# Patient Record
Sex: MALE | Race: WHITE | NOT HISPANIC OR LATINO | URBAN - METROPOLITAN AREA
[De-identification: names, ages, dates, MRNs, and addresses within clinical notes are randomized per-mention and may not be internally consistent; named-entity substitution may affect disease eponyms.]

---

## 2019-03-12 ENCOUNTER — EMERGENCY (EMERGENCY)
Facility: HOSPITAL | Age: 69
LOS: 0 days | Discharge: HOME | End: 2019-03-13
Attending: EMERGENCY MEDICINE
Payer: COMMERCIAL

## 2019-03-12 VITALS
TEMPERATURE: 98 F | OXYGEN SATURATION: 99 % | WEIGHT: 177.91 LBS | RESPIRATION RATE: 18 BRPM | SYSTOLIC BLOOD PRESSURE: 133 MMHG | HEART RATE: 78 BPM | HEIGHT: 75 IN | DIASTOLIC BLOOD PRESSURE: 97 MMHG

## 2019-03-12 DIAGNOSIS — R07.89 OTHER CHEST PAIN: ICD-10-CM

## 2019-03-12 DIAGNOSIS — Z04.1 ENCOUNTER FOR EXAMINATION AND OBSERVATION FOLLOWING TRANSPORT ACCIDENT: ICD-10-CM
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
## 2019-03-12 DIAGNOSIS — Z98.89 OTHER SPECIFIED POSTPROCEDURAL STATES: Chronic | ICD-10-CM
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
## 2019-03-12 DIAGNOSIS — I10 ESSENTIAL (PRIMARY) HYPERTENSION: ICD-10-CM

## 2019-03-12 PROCEDURE — 99053 MED SERV 10PM-8AM 24 HR FAC: CPT

## 2019-03-12 PROCEDURE — 99284 EMERGENCY DEPT VISIT MOD MDM: CPT | Mod: 25
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
## 2019-03-13 VITALS
TEMPERATURE: 98 F | DIASTOLIC BLOOD PRESSURE: 67 MMHG | SYSTOLIC BLOOD PRESSURE: 121 MMHG | RESPIRATION RATE: 15 BRPM | OXYGEN SATURATION: 98 % | HEART RATE: 63 BPM

## 2019-03-13 LAB
ALBUMIN SERPL ELPH-MCNC: 3.6 G/DL — SIGNIFICANT CHANGE UP (ref 3.3–5)
ALP SERPL-CCNC: 73 U/L — SIGNIFICANT CHANGE UP (ref 40–120)
ALT FLD-CCNC: 34 U/L — SIGNIFICANT CHANGE UP (ref 12–78)
ANION GAP SERPL CALC-SCNC: 6 MMOL/L — SIGNIFICANT CHANGE UP (ref 5–17)
APTT BLD: 28.7 SEC — SIGNIFICANT CHANGE UP (ref 28.5–37)
AST SERPL-CCNC: 21 U/L — SIGNIFICANT CHANGE UP (ref 15–37)
BASOPHILS # BLD AUTO: 0.04 K/UL — SIGNIFICANT CHANGE UP (ref 0–0.2)
BASOPHILS NFR BLD AUTO: 0.3 % — SIGNIFICANT CHANGE UP (ref 0–2)
BILIRUB SERPL-MCNC: 0.3 MG/DL — SIGNIFICANT CHANGE UP (ref 0.2–1.2)
BUN SERPL-MCNC: 26 MG/DL — HIGH (ref 7–23)
CALCIUM SERPL-MCNC: 8.5 MG/DL — SIGNIFICANT CHANGE UP (ref 8.5–10.1)
CHLORIDE SERPL-SCNC: 108 MMOL/L — SIGNIFICANT CHANGE UP (ref 96–108)
CK SERPL-CCNC: 174 U/L — SIGNIFICANT CHANGE UP (ref 26–308)
CO2 SERPL-SCNC: 27 MMOL/L — SIGNIFICANT CHANGE UP (ref 22–31)
CREAT SERPL-MCNC: 0.99 MG/DL — SIGNIFICANT CHANGE UP (ref 0.5–1.3)
EOSINOPHIL # BLD AUTO: 0.12 K/UL — SIGNIFICANT CHANGE UP (ref 0–0.5)
EOSINOPHIL NFR BLD AUTO: 0.9 % — SIGNIFICANT CHANGE UP (ref 0–6)
GLUCOSE SERPL-MCNC: 107 MG/DL — HIGH (ref 70–99)
HCT VFR BLD CALC: 44 % — SIGNIFICANT CHANGE UP (ref 39–50)
HGB BLD-MCNC: 14.2 G/DL — SIGNIFICANT CHANGE UP (ref 13–17)
IMM GRANULOCYTES NFR BLD AUTO: 0.3 % — SIGNIFICANT CHANGE UP (ref 0–1.5)
INR BLD: 1.07 RATIO — SIGNIFICANT CHANGE UP (ref 0.88–1.16)
LYMPHOCYTES # BLD AUTO: 1.17 K/UL — SIGNIFICANT CHANGE UP (ref 1–3.3)
LYMPHOCYTES # BLD AUTO: 8.9 % — LOW (ref 13–44)
MCHC RBC-ENTMCNC: 28.9 PG — SIGNIFICANT CHANGE UP (ref 27–34)
MCHC RBC-ENTMCNC: 32.3 GM/DL — SIGNIFICANT CHANGE UP (ref 32–36)
MCV RBC AUTO: 89.6 FL — SIGNIFICANT CHANGE UP (ref 80–100)
MONOCYTES # BLD AUTO: 0.67 K/UL — SIGNIFICANT CHANGE UP (ref 0–0.9)
MONOCYTES NFR BLD AUTO: 5.1 % — SIGNIFICANT CHANGE UP (ref 2–14)
NEUTROPHILS # BLD AUTO: 11.09 K/UL — HIGH (ref 1.8–7.4)
NEUTROPHILS NFR BLD AUTO: 84.5 % — HIGH (ref 43–77)
NRBC # BLD: 0 /100 WBCS — SIGNIFICANT CHANGE UP (ref 0–0)
PLATELET # BLD AUTO: 193 K/UL — SIGNIFICANT CHANGE UP (ref 150–400)
POTASSIUM SERPL-MCNC: 3.8 MMOL/L — SIGNIFICANT CHANGE UP (ref 3.5–5.3)
POTASSIUM SERPL-SCNC: 3.8 MMOL/L — SIGNIFICANT CHANGE UP (ref 3.5–5.3)
PROT SERPL-MCNC: 7.1 GM/DL — SIGNIFICANT CHANGE UP (ref 6–8.3)
PROTHROM AB SERPL-ACNC: 12 SEC — SIGNIFICANT CHANGE UP (ref 10–12.9)
RBC # BLD: 4.91 M/UL — SIGNIFICANT CHANGE UP (ref 4.2–5.8)
RBC # FLD: 12.8 % — SIGNIFICANT CHANGE UP (ref 10.3–14.5)
SODIUM SERPL-SCNC: 141 MMOL/L — SIGNIFICANT CHANGE UP (ref 135–145)
TROPONIN I SERPL-MCNC: <.015 NG/ML — SIGNIFICANT CHANGE UP (ref 0.01–0.04)
TROPONIN I SERPL-MCNC: <.015 NG/ML — SIGNIFICANT CHANGE UP (ref 0.01–0.04)
WBC # BLD: 13.13 K/UL — HIGH (ref 3.8–10.5)
WBC # FLD AUTO: 13.13 K/UL — HIGH (ref 3.8–10.5)

## 2019-03-13 PROCEDURE — 71260 CT THORAX DX C+: CPT | Mod: 26

## 2019-03-13 RX ORDER — SODIUM CHLORIDE 9 MG/ML
1000 INJECTION INTRAMUSCULAR; INTRAVENOUS; SUBCUTANEOUS ONCE
Qty: 0 | Refills: 0 | Status: COMPLETED | OUTPATIENT
Start: 2019-03-13 | End: 2019-03-13

## 2019-03-13 RX ORDER — IBUPROFEN 200 MG
1 TABLET ORAL
Qty: 20 | Refills: 0 | OUTPATIENT
Start: 2019-03-13 | End: 2019-03-17

## 2019-03-13 RX ORDER — IBUPROFEN 200 MG
600 TABLET ORAL ONCE
Qty: 0 | Refills: 0 | Status: COMPLETED | OUTPATIENT
Start: 2019-03-13 | End: 2019-03-13

## 2019-03-13 RX ADMIN — SODIUM CHLORIDE 1000 MILLILITER(S): 9 INJECTION INTRAMUSCULAR; INTRAVENOUS; SUBCUTANEOUS at 02:09

## 2019-03-13 RX ADMIN — Medication 600 MILLIGRAM(S): at 02:09

## 2019-03-13 NOTE — ED ADULT NURSE NOTE - PSH
S/P foot surgery  RiGHT and Left foot hammertoes correction  S/P hernia surgery  Right and Left inguinal hernia repair

## 2019-03-13 NOTE — ED PROVIDER NOTE - PHYSICAL EXAMINATION
Gen: Alert, mild distress, well appearing  Head: NC, AT, PERRL, EOMI, normal lids/conjunctiva  ENT: normal hearing, patent oropharynx without erythema/exudate, uvula midline  Neck: +supple, no tenderness/meningismus/JVD, +Trachea midline  Pulm: Bilateral BS, normal resp effort, no wheeze/stridor/retractions  CV: RRR, no M/R/G, +dist pulses  Abd: soft, NT/ND, +BS, no palpable masses  Mskel: no edema/erythema/cyanosis, +sternal chest wall tenderness to palpitation  Skin: no rash, warm/dry  Neuro: AAOx3, no apparent sensory/motor deficits, coordination intact

## 2019-03-13 NOTE — ED ADULT NURSE NOTE - ED STAT RN HANDOFF DETAILS 2
PT general condition remains the same saturation 98 % on 2L, wheezing preseent endorsed to GAVIN Potter for continued care. general condition remains stable awaiting discharged.

## 2019-03-13 NOTE — ED PROVIDER NOTE - EKG ADDITIONAL INFORMATION FREE TEXT
NSR rate 70, no st e/d, no twi, qtc 429  no prior NSR rate 70, no st e/d, no twi, qtc 429  no prior  Repeat EKG: rate 60, no st e/d, no twi, qtc 428

## 2019-03-13 NOTE — ED ADULT NURSE NOTE - NSIMPLEMENTINTERV_GEN_ALL_ED
Implemented All Universal Safety Interventions:  [place] to call system. Call bell, personal items and telephone within reach. Instruct patient to call for assistance. Room bathroom lighting operational. Non-slip footwear when patient is off stretcher. Physically safe environment: no spills, clutter or unnecessary equipment. Stretcher in lowest position, wheels locked, appropriate side rails in place.
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
## 2022-06-30 ENCOUNTER — APPOINTMENT (OUTPATIENT)
Dept: ORTHOPEDIC SURGERY | Facility: CLINIC | Age: 72
End: 2022-06-30

## 2022-06-30 VITALS — BODY MASS INDEX: 22.13 KG/M2 | HEIGHT: 75 IN | WEIGHT: 178 LBS

## 2022-06-30 DIAGNOSIS — I10 ESSENTIAL (PRIMARY) HYPERTENSION: ICD-10-CM

## 2022-06-30 PROCEDURE — 99214 OFFICE O/P EST MOD 30 MIN: CPT | Mod: 25

## 2022-06-30 PROCEDURE — 20610 DRAIN/INJ JOINT/BURSA W/O US: CPT | Mod: NC

## 2022-06-30 NOTE — PROCEDURE
[de-identified] : Procedure Name: Orthovisc (Large Joint)\par \par Viscosupplementation Injection: X-ray evidence of Osteoarthritis on this or prior visit, Patient has tried OTC's including aspirin, Ibuprofen, Aleve etc or prescription NSAIDS, and/or exercises at home and/ or physical therapy without satisfactory response and Repeat series performed because patient had significant improvement in their pain and functional capacity from prior series which was given more than six months ago. \par \par An injection of Orthovisc 2ml #1 was injected into the bilateral knee(s). The risks, benefits, and alternatives to Viscosupplementation injection were explained in full to the patient. Risks outlined include but are not limited to infection, sepsis, bleeding, scarring, skin discoloration, temporary increase in pain, syncopal episode, failure to resolve symptoms, allergic reaction, and symptom recurrence. Signs and symptoms of infection reviewed and patient advised to call immediately for redness, fevers, and/or chills. Patient understood the risks. All questions were answered. After discussion of options, patient requested Viscosupplementation. Oral informed consent was obtained and sterile prep was done of the injection site. The patient tolerated the procedure well. Ice tonight to the injection site. \par

## 2022-06-30 NOTE — IMAGING
[de-identified] : Left Hip/Thigh: Inspection of the hip/thigh is as follows: Inspection shows no swelling. Palp\par follows: no groin tenderness. Range of motion of the hip is as follows: Patient displays good e\par rotation at 15 degrees Strength testing of the hip/thigh is as follows: Hip flexion strength is \par hip/thigh is as follows: no pain at hip flexors with hip extention and knee flexion. \par Right Knee: Inspection of the knee is as follows: valgus deformity. no ecchymosis and n\par the knee is as follows: medial joint line tenderness and crepitus. Knee Range of Motio\par follows: Extension: 0 degrees. Flexion: 125 degrees. Strength examination of the knee is \par strength is 5/5 Hamstring strength is 5/5 Ligament Stability and Special Test ligamentously st\par examination of the knee is as follows: light touch is intact throughout. Gait and function is a\par Left Knee: Inspection of the knee is as follows: no effusion, erythema, ecchymosis, scars o\par the knee is as follows: medial joint line tenderness, medial femoral condyle tendernes\par Range of Motion is as follows: Flexion: 125 degrees. Anterior pain with flexion. Range o\par extension. Strength examination of the knee is as follows: Quadriceps strength is 5/5 Hamstr\par 5/5 Ligament Stability and Special Test ligamentously stable. Neurological examination of the \par touch is intact throughout. Gait and function is as follows: patient ambulates without as
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
## 2022-07-07 ENCOUNTER — APPOINTMENT (OUTPATIENT)
Dept: ORTHOPEDIC SURGERY | Facility: CLINIC | Age: 72
End: 2022-07-07

## 2022-07-07 VITALS — HEIGHT: 75 IN | BODY MASS INDEX: 22.13 KG/M2 | WEIGHT: 178 LBS

## 2022-07-07 PROCEDURE — 20610 DRAIN/INJ JOINT/BURSA W/O US: CPT | Mod: 50

## 2022-07-07 NOTE — IMAGING
[de-identified] : Left Hip/Thigh: Inspection of the hip/thigh is as follows: Inspection shows no swelling. Palp\par follows: no groin tenderness. Range of motion of the hip is as follows: Patient displays good e\par rotation at 15 degrees Strength testing of the hip/thigh is as follows: Hip flexion strength is \par hip/thigh is as follows: no pain at hip flexors with hip extention and knee flexion. \par Right Knee: Inspection of the knee is as follows: valgus deformity. no ecchymosis and n\par the knee is as follows: medial joint line tenderness and crepitus. Knee Range of Motio\par follows: Extension: 0 degrees. Flexion: 125 degrees. Strength examination of the knee is \par strength is 5/5 Hamstring strength is 5/5 Ligament Stability and Special Test ligamentously st\par examination of the knee is as follows: light touch is intact throughout. Gait and function is a\par Left Knee: Inspection of the knee is as follows: no effusion, erythema, ecchymosis, scars o\par the knee is as follows: medial joint line tenderness, medial femoral condyle tendernes\par Range of Motion is as follows: Flexion: 125 degrees. Anterior pain with flexion. Range o\par extension. Strength examination of the knee is as follows: Quadriceps strength is 5/5 Hamstr\par 5/5 Ligament Stability and Special Test ligamentously stable. Neurological examination of the \par touch is intact throughout. Gait and function is as follows: patient ambulates without as
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
## 2022-07-07 NOTE — PROCEDURE
[de-identified] : Procedure Name: Orthovisc (Large Joint)\par \par Viscosupplementation Injection: X-ray evidence of Osteoarthritis on this or prior visit, Patient has tried OTC's including aspirin, Ibuprofen, Aleve etc or prescription NSAIDS, and/or exercises at home and/ or physical therapy without satisfactory response and Repeat series performed because patient had significant improvement in their pain and functional capacity from prior series which was given more than six months ago. \par \par An injection of Orthovisc 2ml #2 was injected into the bilateral knee(s). The risks, benefits, and alternatives to Viscosupplementation injection were explained in full to the patient. Risks outlined include but are not limited to infection, sepsis, bleeding, scarring, skin discoloration, temporary increase in pain, syncopal episode, failure to resolve symptoms, allergic reaction, and symptom recurrence. Signs and symptoms of infection reviewed and patient advised to call immediately for redness, fevers, and/or chills. Patient understood the risks. All questions were answered. After discussion of options, patient requested Viscosupplementation. Oral informed consent was obtained and sterile prep was done of the injection site. The patient tolerated the procedure well. Ice tonight to the injection site. \par

## 2022-07-14 ENCOUNTER — APPOINTMENT (OUTPATIENT)
Dept: ORTHOPEDIC SURGERY | Facility: CLINIC | Age: 72
End: 2022-07-14

## 2022-07-14 PROCEDURE — 99214 OFFICE O/P EST MOD 30 MIN: CPT | Mod: 25

## 2022-07-14 PROCEDURE — 20610 DRAIN/INJ JOINT/BURSA W/O US: CPT | Mod: 50

## 2022-07-14 NOTE — PROCEDURE
[de-identified] : Procedure Name: Orthovisc (Large Joint)\par \par Viscosupplementation Injection: X-ray evidence of Osteoarthritis on this or prior visit, Patient has tried OTC's including aspirin, Ibuprofen, Aleve etc or prescription NSAIDS, and/or exercises at home and/ or physical therapy without satisfactory response and Repeat series performed because patient had significant improvement in their pain and functional capacity from prior series which was given more than six months ago. \par \par An injection of Orthovisc 2ml #3 was injected into the bilateral knee(s). The risks, benefits, and alternatives to Viscosupplementation injection were explained in full to the patient. Risks outlined include but are not limited to infection, sepsis, bleeding, scarring, skin discoloration, temporary increase in pain, syncopal episode, failure to resolve symptoms, allergic reaction, and symptom recurrence. Signs and symptoms of infection reviewed and patient advised to call immediately for redness, fevers, and/or chills. Patient understood the risks. All questions were answered. After discussion of options, patient requested Viscosupplementation. Oral informed consent was obtained and sterile prep was done of the injection site. The patient tolerated the procedure well. Ice tonight to the injection site. \par

## 2022-07-14 NOTE — IMAGING
[de-identified] : Left Hip/Thigh: Inspection of the hip/thigh is as follows: Inspection shows no swelling. Palp\par follows: no groin tenderness. Range of motion of the hip is as follows: Patient displays good e\par rotation at 15 degrees Strength testing of the hip/thigh is as follows: Hip flexion strength is \par hip/thigh is as follows: no pain at hip flexors with hip extention and knee flexion. \par Right Knee: Inspection of the knee is as follows: valgus deformity. no ecchymosis and n\par the knee is as follows: medial joint line tenderness and crepitus. Knee Range of Motio\par follows: Extension: 0 degrees. Flexion: 125 degrees. Strength examination of the knee is \par strength is 5/5 Hamstring strength is 5/5 Ligament Stability and Special Test ligamentously st\par examination of the knee is as follows: light touch is intact throughout. Gait and function is a\par Left Knee: Inspection of the knee is as follows: no effusion, erythema, ecchymosis, scars o\par the knee is as follows: medial joint line tenderness, medial femoral condyle tendernes\par Range of Motion is as follows: Flexion: 125 degrees. Anterior pain with flexion. Range o\par extension. Strength examination of the knee is as follows: Quadriceps strength is 5/5 Hamstr\par 5/5 Ligament Stability and Special Test ligamentously stable. Neurological examination of the \par touch is intact throughout. Gait and function is as follows: patient ambulates without as
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
## 2022-07-28 ENCOUNTER — APPOINTMENT (OUTPATIENT)
Dept: ORTHOPEDIC SURGERY | Facility: CLINIC | Age: 72
End: 2022-07-28

## 2022-07-28 PROCEDURE — 20610 DRAIN/INJ JOINT/BURSA W/O US: CPT | Mod: 50
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
## 2022-07-28 NOTE — IMAGING
[de-identified] : Left Hip/Thigh: Inspection of the hip/thigh is as follows: Inspection shows no swelling. Palp\par follows: no groin tenderness. Range of motion of the hip is as follows: Patient displays good e\par rotation at 15 degrees Strength testing of the hip/thigh is as follows: Hip flexion strength is \par hip/thigh is as follows: no pain at hip flexors with hip extention and knee flexion. \par Right Knee: Inspection of the knee is as follows: valgus deformity. no ecchymosis and n\par the knee is as follows: medial joint line tenderness and crepitus. Knee Range of Motio\par follows: Extension: 0 degrees. Flexion: 125 degrees. Strength examination of the knee is \par strength is 5/5 Hamstring strength is 5/5 Ligament Stability and Special Test ligamentously st\par examination of the knee is as follows: light touch is intact throughout. Gait and function is a\par Left Knee: Inspection of the knee is as follows: no effusion, erythema, ecchymosis, scars o\par the knee is as follows: medial joint line tenderness, medial femoral condyle tendernes\par Range of Motion is as follows: Flexion: 125 degrees. Anterior pain with flexion. Range o\par extension. Strength examination of the knee is as follows: Quadriceps strength is 5/5 Hamstr\par 5/5 Ligament Stability and Special Test ligamentously stable. Neurological examination of the \par touch is intact throughout. Gait and function is as follows: patient ambulates without as

## 2022-07-28 NOTE — PROCEDURE
[de-identified] : Procedure Name: Orthovisc (Large Joint)\par \par Viscosupplementation Injection: X-ray evidence of Osteoarthritis on this or prior visit, Patient has tried OTC's including aspirin, Ibuprofen, Aleve etc or prescription NSAIDS, and/or exercises at home and/ or physical therapy without satisfactory response and Repeat series performed because patient had significant improvement in their pain and functional capacity from prior series which was given more than six months ago. \par \par An injection of Orthovisc 2ml #4 was injected into the bilateral knee(s). The risks, benefits, and alternatives to Viscosupplementation injection were explained in full to the patient. Risks outlined include but are not limited to infection, sepsis, bleeding, scarring, skin discoloration, temporary increase in pain, syncopal episode, failure to resolve symptoms, allergic reaction, and symptom recurrence. Signs and symptoms of infection reviewed and patient advised to call immediately for redness, fevers, and/or chills. Patient understood the risks. All questions were answered. After discussion of options, patient requested Viscosupplementation. Oral informed consent was obtained and sterile prep was done of the injection site. The patient tolerated the procedure well. Ice tonight to the injection site. \par

## 2022-11-14 NOTE — REASON FOR VISIT
[FreeTextEntry2] : orthovisc bilateral knees number 4
PAST MEDICAL HISTORY:  BPH (benign prostatic hypertrophy)     CKD (chronic kidney disease)     DM (diabetes mellitus)     HLD (hyperlipidemia)     HTN (hypertension)     PAD (peripheral artery disease) s/p stent placement

## 2023-02-02 ENCOUNTER — APPOINTMENT (OUTPATIENT)
Dept: ORTHOPEDIC SURGERY | Facility: CLINIC | Age: 73
End: 2023-02-02
Payer: MEDICARE

## 2023-02-02 VITALS — BODY MASS INDEX: 22.13 KG/M2 | HEIGHT: 75 IN | WEIGHT: 178 LBS

## 2023-02-02 PROCEDURE — 20610 DRAIN/INJ JOINT/BURSA W/O US: CPT | Mod: 50

## 2023-02-02 PROCEDURE — 99214 OFFICE O/P EST MOD 30 MIN: CPT | Mod: 25

## 2023-02-02 PROCEDURE — 73564 X-RAY EXAM KNEE 4 OR MORE: CPT | Mod: 50
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
## 2023-02-02 NOTE — ASSESSMENT
[FreeTextEntry1] : 72 year M WITH MODERATE B/L KNEE OA. HAD GOOD RELIEF FROM ORTHOVISC SERIES 7/28/22. PAIN WORSENS WITH STAIRS AND WALKING PROLONGED DISTANCES. PAIN IS AFFECTING ADL AND FUNCTIONAL ACTIVITIES. TREATMENT OPTIONS REVIEWED. \par \par B/L KNEE ORTHOVISC #1 TODAY. PATIENT TOLERATED INJECTION WELL.

## 2023-02-02 NOTE — PROCEDURE
[de-identified] : Procedure Name: Orthovisc (Large Joint)\par \par Viscosupplementation Injection: X-ray evidence of Osteoarthritis on this or prior visit, Patient has tried OTC's including aspirin, Ibuprofen, Aleve etc or prescription NSAIDS, and/or exercises at home and/ or physical therapy without satisfactory response and Repeat series performed because patient had significant improvement in their pain and functional capacity from prior series which was given more than six months ago. \par \par An injection of Orthovisc 2ml #1 was injected into the bilateral knee(s). The risks, benefits, and alternatives to Viscosupplementation injection were explained in full to the patient. Risks outlined include but are not limited to infection, sepsis, bleeding, scarring, skin discoloration, temporary increase in pain, syncopal episode, failure to resolve symptoms, allergic reaction, and symptom recurrence. Signs and symptoms of infection reviewed and patient advised to call immediately for redness, fevers, and/or chills. Patient understood the risks. All questions were answered. After discussion of options, patient requested Viscosupplementation. Oral informed consent was obtained and sterile prep was done of the injection site. The patient tolerated the procedure well. Ice tonight to the injection site. \par

## 2023-02-02 NOTE — IMAGING
[de-identified] : Left Hip/Thigh: Inspection of the hip/thigh is as follows: Inspection shows no swelling. Palp\par follows: no groin tenderness. Range of motion of the hip is as follows: Patient displays good e\par rotation at 15 degrees Strength testing of the hip/thigh is as follows: Hip flexion strength is \par hip/thigh is as follows: no pain at hip flexors with hip extention and knee flexion. \par Right Knee: Inspection of the knee is as follows: valgus deformity. no ecchymosis and n\par the knee is as follows: medial joint line tenderness and crepitus. Knee Range of Motio\par follows: Extension: 0 degrees. Flexion: 125 degrees. Strength examination of the knee is \par strength is 5/5 Hamstring strength is 5/5 Ligament Stability and Special Test ligamentously st\par examination of the knee is as follows: light touch is intact throughout. Gait and function is a\par Left Knee: Inspection of the knee is as follows: no effusion, erythema, ecchymosis, scars o\par the knee is as follows: medial joint line tenderness, medial femoral condyle tendernes\par Range of Motion is as follows: Flexion: 125 degrees. Anterior pain with flexion. Range o\par extension. Strength examination of the knee is as follows: Quadriceps strength is 5/5 Hamstr\par 5/5 Ligament Stability and Special Test ligamentously stable. Neurological examination of the \par touch is intact throughout. Gait and function is as follows: patient ambulates without as

## 2023-02-09 ENCOUNTER — APPOINTMENT (OUTPATIENT)
Dept: ORTHOPEDIC SURGERY | Facility: CLINIC | Age: 73
End: 2023-02-09
Payer: MEDICARE

## 2023-02-09 PROCEDURE — 20610 DRAIN/INJ JOINT/BURSA W/O US: CPT | Mod: 50

## 2023-02-09 NOTE — PROCEDURE
[de-identified] : Procedure Name: Orthovisc (Large Joint)\par \par Viscosupplementation Injection: X-ray evidence of Osteoarthritis on this or prior visit, Patient has tried OTC's including aspirin, Ibuprofen, Aleve etc or prescription NSAIDS, and/or exercises at home and/ or physical therapy without satisfactory response and Repeat series performed because patient had significant improvement in their pain and functional capacity from prior series which was given more than six months ago. \par \par An injection of Orthovisc 2ml #2 was injected into the bilateral knee(s). The risks, benefits, and alternatives to Viscosupplementation injection were explained in full to the patient. Risks outlined include but are not limited to infection, sepsis, bleeding, scarring, skin discoloration, temporary increase in pain, syncopal episode, failure to resolve symptoms, allergic reaction, and symptom recurrence. Signs and symptoms of infection reviewed and patient advised to call immediately for redness, fevers, and/or chills. Patient understood the risks. All questions were answered. After discussion of options, patient requested Viscosupplementation. Oral informed consent was obtained and sterile prep was done of the injection site. The patient tolerated the procedure well. Ice tonight to the injection site. \par

## 2023-02-09 NOTE — ASSESSMENT
[FreeTextEntry1] : 72 year M WITH MODERATE B/L KNEE OA. HAD GOOD RELIEF FROM ORTHOVISC SERIES 7/28/22. PAIN WORSENS WITH STAIRS AND WALKING PROLONGED DISTANCES. PAIN IS AFFECTING ADL AND FUNCTIONAL ACTIVITIES. TREATMENT OPTIONS REVIEWED. \par \par B/L KNEE ORTHOVISC #2 TODAY. PATIENT TOLERATED INJECTION WELL.
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
## 2023-02-09 NOTE — IMAGING
[de-identified] : Left Hip/Thigh: Inspection of the hip/thigh is as follows: Inspection shows no swelling. Palp\par follows: no groin tenderness. Range of motion of the hip is as follows: Patient displays good e\par rotation at 15 degrees Strength testing of the hip/thigh is as follows: Hip flexion strength is \par hip/thigh is as follows: no pain at hip flexors with hip extention and knee flexion. \par Right Knee: Inspection of the knee is as follows: valgus deformity. no ecchymosis and n\par the knee is as follows: medial joint line tenderness and crepitus. Knee Range of Motio\par follows: Extension: 0 degrees. Flexion: 125 degrees. Strength examination of the knee is \par strength is 5/5 Hamstring strength is 5/5 Ligament Stability and Special Test ligamentously st\par examination of the knee is as follows: light touch is intact throughout. Gait and function is a\par Left Knee: Inspection of the knee is as follows: no effusion, erythema, ecchymosis, scars o\par the knee is as follows: medial joint line tenderness, medial femoral condyle tendernes\par Range of Motion is as follows: Flexion: 125 degrees. Anterior pain with flexion. Range o\par extension. Strength examination of the knee is as follows: Quadriceps strength is 5/5 Hamstr\par 5/5 Ligament Stability and Special Test ligamentously stable. Neurological examination of the \par touch is intact throughout. Gait and function is as follows: patient ambulates without as

## 2023-02-16 ENCOUNTER — APPOINTMENT (OUTPATIENT)
Dept: ORTHOPEDIC SURGERY | Facility: CLINIC | Age: 73
End: 2023-02-16
Payer: MEDICARE

## 2023-02-16 PROCEDURE — 20610 DRAIN/INJ JOINT/BURSA W/O US: CPT | Mod: 50

## 2023-02-16 NOTE — PROCEDURE
[de-identified] : Procedure Name: Orthovisc (Large Joint)\par \par Viscosupplementation Injection: X-ray evidence of Osteoarthritis on this or prior visit, Patient has tried OTC's including aspirin, Ibuprofen, Aleve etc or prescription NSAIDS, and/or exercises at home and/ or physical therapy without satisfactory response and Repeat series performed because patient had significant improvement in their pain and functional capacity from prior series which was given more than six months ago. \par \par An injection of Orthovisc 2ml #3 was injected into the bilateral knee(s). The risks, benefits, and alternatives to Viscosupplementation injection were explained in full to the patient. Risks outlined include but are not limited to infection, sepsis, bleeding, scarring, skin discoloration, temporary increase in pain, syncopal episode, failure to resolve symptoms, allergic reaction, and symptom recurrence. Signs and symptoms of infection reviewed and patient advised to call immediately for redness, fevers, and/or chills. Patient understood the risks. All questions were answered. After discussion of options, patient requested Viscosupplementation. Oral informed consent was obtained and sterile prep was done of the injection site. The patient tolerated the procedure well. Ice tonight to the injection site. \par
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
## 2023-02-16 NOTE — ASSESSMENT
[FreeTextEntry1] : 72 year M WITH MODERATE B/L KNEE OA. HAD GOOD RELIEF FROM ORTHOVISC SERIES 7/28/22. PAIN WORSENS WITH STAIRS AND WALKING PROLONGED DISTANCES. PAIN IS AFFECTING ADL AND FUNCTIONAL ACTIVITIES. TREATMENT OPTIONS REVIEWED. \par \par B/L KNEE ORTHOVISC #3 TODAY. PATIENT TOLERATED INJECTION WELL.

## 2023-02-16 NOTE — IMAGING
[de-identified] : Left Hip/Thigh: Inspection of the hip/thigh is as follows: Inspection shows no swelling. Palp\par follows: no groin tenderness. Range of motion of the hip is as follows: Patient displays good e\par rotation at 15 degrees Strength testing of the hip/thigh is as follows: Hip flexion strength is \par hip/thigh is as follows: no pain at hip flexors with hip extention and knee flexion. \par Right Knee: Inspection of the knee is as follows: valgus deformity. no ecchymosis and n\par the knee is as follows: medial joint line tenderness and crepitus. Knee Range of Motio\par follows: Extension: 0 degrees. Flexion: 125 degrees. Strength examination of the knee is \par strength is 5/5 Hamstring strength is 5/5 Ligament Stability and Special Test ligamentously st\par examination of the knee is as follows: light touch is intact throughout. Gait and function is a\par Left Knee: Inspection of the knee is as follows: no effusion, erythema, ecchymosis, scars o\par the knee is as follows: medial joint line tenderness, medial femoral condyle tendernes\par Range of Motion is as follows: Flexion: 125 degrees. Anterior pain with flexion. Range o\par extension. Strength examination of the knee is as follows: Quadriceps strength is 5/5 Hamstr\par 5/5 Ligament Stability and Special Test ligamentously stable. Neurological examination of the \par touch is intact throughout. Gait and function is as follows: patient ambulates without as

## 2023-03-02 ENCOUNTER — APPOINTMENT (OUTPATIENT)
Dept: ORTHOPEDIC SURGERY | Facility: CLINIC | Age: 73
End: 2023-03-02
Payer: MEDICARE

## 2023-03-02 VITALS — WEIGHT: 178 LBS | HEIGHT: 75 IN | BODY MASS INDEX: 22.13 KG/M2

## 2023-03-02 PROCEDURE — 20610 DRAIN/INJ JOINT/BURSA W/O US: CPT | Mod: 50

## 2023-03-02 NOTE — IMAGING
[de-identified] : Left Hip/Thigh: Inspection of the hip/thigh is as follows: Inspection shows no swelling. Palp\par follows: no groin tenderness. Range of motion of the hip is as follows: Patient displays good e\par rotation at 15 degrees Strength testing of the hip/thigh is as follows: Hip flexion strength is \par hip/thigh is as follows: no pain at hip flexors with hip extention and knee flexion. \par Right Knee: Inspection of the knee is as follows: valgus deformity. no ecchymosis and n\par the knee is as follows: medial joint line tenderness and crepitus. Knee Range of Motio\par follows: Extension: 0 degrees. Flexion: 125 degrees. Strength examination of the knee is \par strength is 5/5 Hamstring strength is 5/5 Ligament Stability and Special Test ligamentously st\par examination of the knee is as follows: light touch is intact throughout. Gait and function is a\par Left Knee: Inspection of the knee is as follows: no effusion, erythema, ecchymosis, scars o\par the knee is as follows: medial joint line tenderness, medial femoral condyle tendernes\par Range of Motion is as follows: Flexion: 125 degrees. Anterior pain with flexion. Range o\par extension. Strength examination of the knee is as follows: Quadriceps strength is 5/5 Hamstr\par 5/5 Ligament Stability and Special Test ligamentously stable. Neurological examination of the \par touch is intact throughout. Gait and function is as follows: patient ambulates without as

## 2023-03-02 NOTE — ASSESSMENT
[FreeTextEntry1] : 72 year M WITH MODERATE B/L KNEE OA. HAD GOOD RELIEF FROM ORTHOVISC SERIES 7/28/22. PAIN WORSENS WITH STAIRS AND WALKING PROLONGED DISTANCES. PAIN IS AFFECTING ADL AND FUNCTIONAL ACTIVITIES. TREATMENT OPTIONS REVIEWED. \par \par B/L KNEE ORTHOVISC #4 TODAY. PATIENT TOLERATED INJECTION WELL.

## 2023-03-02 NOTE — PROCEDURE
[de-identified] : Procedure Name: Orthovisc (Large Joint)\par \par Viscosupplementation Injection: X-ray evidence of Osteoarthritis on this or prior visit, Patient has tried OTC's including aspirin, Ibuprofen, Aleve etc or prescription NSAIDS, and/or exercises at home and/ or physical therapy without satisfactory response and Repeat series performed because patient had significant improvement in their pain and functional capacity from prior series which was given more than six months ago. \par \par An injection of Orthovisc 2ml #4 was injected into the bilateral knee(s). The risks, benefits, and alternatives to Viscosupplementation injection were explained in full to the patient. Risks outlined include but are not limited to infection, sepsis, bleeding, scarring, skin discoloration, temporary increase in pain, syncopal episode, failure to resolve symptoms, allergic reaction, and symptom recurrence. Signs and symptoms of infection reviewed and patient advised to call immediately for redness, fevers, and/or chills. Patient understood the risks. All questions were answered. After discussion of options, patient requested Viscosupplementation. Oral informed consent was obtained and sterile prep was done of the injection site. The patient tolerated the procedure well. Ice tonight to the injection site. \par
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
## 2023-07-06 ENCOUNTER — OFFICE (OUTPATIENT)
Facility: LOCATION | Age: 73
Setting detail: OPHTHALMOLOGY
End: 2023-07-06
Payer: MEDICARE

## 2023-07-06 DIAGNOSIS — H02.403: ICD-10-CM

## 2023-07-06 DIAGNOSIS — H25.13: ICD-10-CM

## 2023-07-06 DIAGNOSIS — H02.032: ICD-10-CM

## 2023-07-06 DIAGNOSIS — H18.413: ICD-10-CM

## 2023-07-06 DIAGNOSIS — H02.035: ICD-10-CM

## 2023-07-06 DIAGNOSIS — H16.223: ICD-10-CM

## 2023-07-06 PROCEDURE — 92014 COMPRE OPH EXAM EST PT 1/>: CPT | Performed by: OPHTHALMOLOGY

## 2023-07-06 ASSESSMENT — REFRACTION_CURRENTRX
OS_CYLINDER: -0.75
OD_OVR_VA: 20/
OD_CYLINDER: -0.50
OD_SPHERE: +2.75
OS_SPHERE: +2.75
OD_AXIS: 108
OS_AXIS: 103
OD_ADD: +2.50
OS_ADD: +2.25
OS_OVR_VA: 20/

## 2023-07-06 ASSESSMENT — LID EXAM ASSESSMENTS
OD_LAXITY: 2+
OD_LEVATOR_FUNCTION: 17 MM
OD_MRD1: 1 MM
OS_LAXITY: 2+
OS_LEVATOR_FUNCTION: 17 MM
OD_COMMENTS: 2+ LAXIT
OS_MRD1: 1 MM
OS_COMMENTS: 2+ LAXIT

## 2023-07-06 ASSESSMENT — KERATOMETRY
OD_K2POWER_DIOPTERS: 43.25
OD_AXISANGLE_DEGREES: 099
OS_K2POWER_DIOPTERS: 43.00
OS_K1POWER_DIOPTERS: 42.50
OS_AXISANGLE_DEGREES: 096
OD_K1POWER_DIOPTERS: 42.50

## 2023-07-06 ASSESSMENT — AXIALLENGTH_DERIVED
OD_AL: 22.6437
OS_AL: 22.7312

## 2023-07-06 ASSESSMENT — REFRACTION_AUTOREFRACTION
OD_SPHERE: +3.50
OS_CYLINDER: 0.00
OD_AXIS: 080
OS_SPHERE: +3.00
OD_CYLINDER: -0.75
OS_AXIS: 000

## 2023-07-06 ASSESSMENT — SUPERFICIAL PUNCTATE KERATITIS (SPK)
OS_SPK: 3+
OD_SPK: 3+

## 2023-07-06 ASSESSMENT — CONFRONTATIONAL VISUAL FIELD TEST (CVF)
OD_FINDINGS: FULL
OS_FINDINGS: FULL

## 2023-07-06 ASSESSMENT — SPHEQUIV_DERIVED
OD_SPHEQUIV: 3.125
OS_SPHEQUIV: 3

## 2023-07-06 ASSESSMENT — VISUAL ACUITY
OD_BCVA: 20/20
OS_BCVA: 20/30-2

## 2023-07-06 ASSESSMENT — TONOMETRY
OS_IOP_MMHG: 13
OD_IOP_MMHG: 13

## 2023-07-06 ASSESSMENT — LID POSITION - PTOSIS
OS_PTOSIS: LUL
OD_PTOSIS: RUL

## 2023-07-27 ENCOUNTER — APPOINTMENT (OUTPATIENT)
Dept: ORTHOPEDIC SURGERY | Facility: CLINIC | Age: 73
End: 2023-07-27

## 2023-07-27 VITALS — HEIGHT: 75 IN | WEIGHT: 178 LBS | BODY MASS INDEX: 22.13 KG/M2

## 2023-09-07 ENCOUNTER — APPOINTMENT (OUTPATIENT)
Dept: ORTHOPEDIC SURGERY | Facility: CLINIC | Age: 73
End: 2023-09-07
Payer: MEDICARE

## 2023-09-07 VITALS — BODY MASS INDEX: 22.13 KG/M2 | HEIGHT: 75 IN | WEIGHT: 178 LBS

## 2023-09-07 PROCEDURE — 20610 DRAIN/INJ JOINT/BURSA W/O US: CPT | Mod: LT

## 2023-09-07 PROCEDURE — 99213 OFFICE O/P EST LOW 20 MIN: CPT | Mod: 25

## 2023-09-07 NOTE — IMAGING
[de-identified] : Left Hip/Thigh: Inspection of the hip/thigh is as follows: Inspection shows no swelling. Palp\par  follows: no groin tenderness. Range of motion of the hip is as follows: Patient displays good e\par  rotation at 15 degrees Strength testing of the hip/thigh is as follows: Hip flexion strength is \par  hip/thigh is as follows: no pain at hip flexors with hip extention and knee flexion. \par  Right Knee: Inspection of the knee is as follows: valgus deformity. no ecchymosis and n\par  the knee is as follows: medial joint line tenderness and crepitus. Knee Range of Motio\par  follows: Extension: 0 degrees. Flexion: 125 degrees. Strength examination of the knee is \par  strength is 5/5 Hamstring strength is 5/5 Ligament Stability and Special Test ligamentously st\par  examination of the knee is as follows: light touch is intact throughout. Gait and function is a\par  Left Knee: Inspection of the knee is as follows: no effusion, erythema, ecchymosis, scars o\par  the knee is as follows: medial joint line tenderness, medial femoral condyle tendernes\par  Range of Motion is as follows: Flexion: 125 degrees. Anterior pain with flexion. Range o\par  extension. Strength examination of the knee is as follows: Quadriceps strength is 5/5 Hamstr\par  5/5 Ligament Stability and Special Test ligamentously stable. Neurological examination of the \par  touch is intact throughout. Gait and function is as follows: patient ambulates without as

## 2023-09-07 NOTE — ASSESSMENT
[FreeTextEntry1] : 72 year M WITH MODERATE B/L KNEE OA. HAD GOOD RELIEF FROM ORTHOVISC SERIES 7/28/22. PAIN WORSENS WITH STAIRS AND WALKING PROLONGED DISTANCES. PAIN IS AFFECTING ADL AND FUNCTIONAL ACTIVITIES. TREATMENT OPTIONS REVIEWED.   B/L KNEE ORTHOVISC #1 TODAY. PATIENT TOLERATED INJECTION WELL.
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
## 2023-09-07 NOTE — PROCEDURE
[de-identified] : Procedure Name: Orthovisc (Large Joint)  Viscosupplementation Injection: X-ray evidence of Osteoarthritis on this or prior visit, Patient has tried OTC's including aspirin, Ibuprofen, Aleve etc or prescription NSAIDS, and/or exercises at home and/ or physical therapy without satisfactory response and Repeat series performed because patient had significant improvement in their pain and functional capacity from prior series which was given more than six months ago.   An injection of Orthovisc 2ml #1 was injected into the bilateral knee(s). The risks, benefits, and alternatives to Viscosupplementation injection were explained in full to the patient. Risks outlined include but are not limited to infection, sepsis, bleeding, scarring, skin discoloration, temporary increase in pain, syncopal episode, failure to resolve symptoms, allergic reaction, and symptom recurrence. Signs and symptoms of infection reviewed and patient advised to call immediately for redness, fevers, and/or chills. Patient understood the risks. All questions were answered. After discussion of options, patient requested Viscosupplementation. Oral informed consent was obtained and sterile prep was done of the injection site. The patient tolerated the procedure well. Ice tonight to the injection site.

## 2023-09-14 ENCOUNTER — APPOINTMENT (OUTPATIENT)
Dept: ORTHOPEDIC SURGERY | Facility: CLINIC | Age: 73
End: 2023-09-14
Payer: MEDICARE

## 2023-09-14 PROCEDURE — 20610 DRAIN/INJ JOINT/BURSA W/O US: CPT | Mod: 50

## 2023-09-21 ENCOUNTER — APPOINTMENT (OUTPATIENT)
Dept: ORTHOPEDIC SURGERY | Facility: CLINIC | Age: 73
End: 2023-09-21
Payer: MEDICARE

## 2023-09-21 VITALS — HEIGHT: 75 IN | WEIGHT: 178 LBS | BODY MASS INDEX: 22.13 KG/M2

## 2023-09-21 PROCEDURE — 20610 DRAIN/INJ JOINT/BURSA W/O US: CPT | Mod: 50

## 2023-09-28 ENCOUNTER — APPOINTMENT (OUTPATIENT)
Dept: ORTHOPEDIC SURGERY | Facility: CLINIC | Age: 73
End: 2023-09-28
Payer: MEDICARE

## 2023-09-28 VITALS — WEIGHT: 178 LBS | BODY MASS INDEX: 22.13 KG/M2 | HEIGHT: 75 IN

## 2023-09-28 PROCEDURE — 20610 DRAIN/INJ JOINT/BURSA W/O US: CPT | Mod: 50

## 2024-04-04 ENCOUNTER — APPOINTMENT (OUTPATIENT)
Dept: ORTHOPEDIC SURGERY | Facility: CLINIC | Age: 74
End: 2024-04-04
Payer: MEDICARE

## 2024-04-04 ENCOUNTER — APPOINTMENT (OUTPATIENT)
Dept: ORTHOPEDIC SURGERY | Facility: CLINIC | Age: 74
End: 2024-04-04

## 2024-04-04 VITALS — HEIGHT: 75 IN | BODY MASS INDEX: 22.13 KG/M2 | WEIGHT: 178 LBS

## 2024-04-04 DIAGNOSIS — Z78.9 OTHER SPECIFIED HEALTH STATUS: ICD-10-CM

## 2024-04-04 DIAGNOSIS — E78.00 PURE HYPERCHOLESTEROLEMIA, UNSPECIFIED: ICD-10-CM

## 2024-04-04 PROCEDURE — 20610 DRAIN/INJ JOINT/BURSA W/O US: CPT | Mod: 50

## 2024-04-04 PROCEDURE — 99214 OFFICE O/P EST MOD 30 MIN: CPT | Mod: 25

## 2024-04-04 NOTE — IMAGING
[de-identified] : Left Hip/Thigh: Inspection of the hip/thigh is as follows: Inspection shows no swelling. Palp\par  follows: no groin tenderness. Range of motion of the hip is as follows: Patient displays good e\par  rotation at 15 degrees Strength testing of the hip/thigh is as follows: Hip flexion strength is \par  hip/thigh is as follows: no pain at hip flexors with hip extention and knee flexion. \par  Right Knee: Inspection of the knee is as follows: valgus deformity. no ecchymosis and n\par  the knee is as follows: medial joint line tenderness and crepitus. Knee Range of Motio\par  follows: Extension: 0 degrees. Flexion: 125 degrees. Strength examination of the knee is \par  strength is 5/5 Hamstring strength is 5/5 Ligament Stability and Special Test ligamentously st\par  examination of the knee is as follows: light touch is intact throughout. Gait and function is a\par  Left Knee: Inspection of the knee is as follows: no effusion, erythema, ecchymosis, scars o\par  the knee is as follows: medial joint line tenderness, medial femoral condyle tendernes\par  Range of Motion is as follows: Flexion: 125 degrees. Anterior pain with flexion. Range o\par  extension. Strength examination of the knee is as follows: Quadriceps strength is 5/5 Hamstr\par  5/5 Ligament Stability and Special Test ligamentously stable. Neurological examination of the \par  touch is intact throughout. Gait and function is as follows: patient ambulates without as

## 2024-04-04 NOTE — PROCEDURE
[de-identified] : Procedure Name: Orthovisc (Large Joint)  Viscosupplementation Injection: X-ray evidence of Osteoarthritis on this or prior visit, Patient has tried OTC's including aspirin, Ibuprofen, Aleve etc or prescription NSAIDS, and/or exercises at home and/ or physical therapy without satisfactory response and Repeat series performed because patient had significant improvement in their pain and functional capacity from prior series which was given more than six months ago.   An injection of Orthovisc 2ml #1 was injected into the bilateral knee(s). The risks, benefits, and alternatives to Viscosupplementation injection were explained in full to the patient. Risks outlined include but are not limited to infection, sepsis, bleeding, scarring, skin discoloration, temporary increase in pain, syncopal episode, failure to resolve symptoms, allergic reaction, and symptom recurrence. Signs and symptoms of infection reviewed and patient advised to call immediately for redness, fevers, and/or chills. Patient understood the risks. All questions were answered. After discussion of options, patient requested Viscosupplementation. Oral informed consent was obtained and sterile prep was done of the injection site. The patient tolerated the procedure well. Ice tonight to the injection site.

## 2024-04-04 NOTE — ASSESSMENT
[FreeTextEntry1] : 73 year M WITH MODERATE B/L KNEE OA. HAD GOOD RELIEF FROM ORTHOVISC SERIES 7/28/22. PAIN WORSENS WITH STAIRS AND WALKING PROLONGED DISTANCES. PAIN IS AFFECTING ADL AND FUNCTIONAL ACTIVITIES. TREATMENT OPTIONS REVIEWED.   B/L KNEE ORTHOVISC #1 TODAY. PATIENT TOLERATED INJECTION WELL.

## 2024-04-04 NOTE — HISTORY OF PRESENT ILLNESS
[Localized] : localized [Intermittent] : intermittent [Household chores] : household chores [Rest] : rest [Injection therapy] : injection therapy [Standing] : standing [Walking] : walking [Stairs] : stairs [4] : 4 [0] : 0 [de-identified] : 09/28/2023:   bl knees orthovisc injections #  4   04/04/2024: here for follow up. would like to start b/l knee orthovisc injections [] : no [FreeTextEntry1] : BOTH KNEES [de-identified] : orthovisc injections

## 2024-04-11 ENCOUNTER — APPOINTMENT (OUTPATIENT)
Dept: ORTHOPEDIC SURGERY | Facility: CLINIC | Age: 74
End: 2024-04-11
Payer: MEDICARE

## 2024-04-11 VITALS — HEIGHT: 75 IN | WEIGHT: 178 LBS | BODY MASS INDEX: 22.13 KG/M2

## 2024-04-11 PROCEDURE — 20610 DRAIN/INJ JOINT/BURSA W/O US: CPT | Mod: 50

## 2024-04-11 NOTE — ASSESSMENT
[FreeTextEntry1] : 73 year M WITH MODERATE B/L KNEE OA. HAD GOOD RELIEF FROM ORTHOVISC SERIES 7/28/22. PAIN WORSENS WITH STAIRS AND WALKING PROLONGED DISTANCES. PAIN IS AFFECTING ADL AND FUNCTIONAL ACTIVITIES. TREATMENT OPTIONS REVIEWED.   B/L KNEE ORTHOVISC #2 TODAY. PATIENT TOLERATED INJECTION WELL.

## 2024-04-11 NOTE — PROCEDURE
[de-identified] : Procedure Name: Orthovisc (Large Joint)  Viscosupplementation Injection: X-ray evidence of Osteoarthritis on this or prior visit, Patient has tried OTC's including aspirin, Ibuprofen, Aleve etc or prescription NSAIDS, and/or exercises at home and/ or physical therapy without satisfactory response and Repeat series performed because patient had significant improvement in their pain and functional capacity from prior series which was given more than six months ago.   An injection of Orthovisc 2ml #2 was injected into the bilateral knee(s). The risks, benefits, and alternatives to Viscosupplementation injection were explained in full to the patient. Risks outlined include but are not limited to infection, sepsis, bleeding, scarring, skin discoloration, temporary increase in pain, syncopal episode, failure to resolve symptoms, allergic reaction, and symptom recurrence. Signs and symptoms of infection reviewed and patient advised to call immediately for redness, fevers, and/or chills. Patient understood the risks. All questions were answered. After discussion of options, patient requested Viscosupplementation. Oral informed consent was obtained and sterile prep was done of the injection site. The patient tolerated the procedure well. Ice tonight to the injection site.

## 2024-04-11 NOTE — HISTORY OF PRESENT ILLNESS
[0] : 0 [Localized] : localized [Intermittent] : intermittent [Household chores] : household chores [Rest] : rest [Injection therapy] : injection therapy [Standing] : standing [Walking] : walking [Stairs] : stairs [2] : 2 [de-identified] : 09/28/2023:   bl knees orthovisc injections #  4   04/04/2024: here for follow up. would like to start b/l knee orthovisc injections  4.11.24 here for [place] knee pain, orthovisc #2 [] : no [FreeTextEntry1] : BOTH KNEES [de-identified] : orthovisc injections

## 2024-04-25 ENCOUNTER — APPOINTMENT (OUTPATIENT)
Dept: ORTHOPEDIC SURGERY | Facility: CLINIC | Age: 74
End: 2024-04-25
Payer: MEDICARE

## 2024-04-25 VITALS — BODY MASS INDEX: 22.13 KG/M2 | HEIGHT: 75 IN | WEIGHT: 178 LBS

## 2024-04-25 PROCEDURE — 20610 DRAIN/INJ JOINT/BURSA W/O US: CPT | Mod: 50

## 2024-04-25 PROCEDURE — J3490M: CUSTOM | Mod: NC

## 2024-04-25 NOTE — IMAGING
[de-identified] : Left Hip/Thigh: Inspection of the hip/thigh is as follows: Inspection shows no swelling. Palp\par  follows: no groin tenderness. Range of motion of the hip is as follows: Patient displays good e\par  rotation at 15 degrees Strength testing of the hip/thigh is as follows: Hip flexion strength is \par  hip/thigh is as follows: no pain at hip flexors with hip extention and knee flexion. \par  Right Knee: Inspection of the knee is as follows: valgus deformity. no ecchymosis and n\par  the knee is as follows: medial joint line tenderness and crepitus. Knee Range of Motio\par  follows: Extension: 0 degrees. Flexion: 125 degrees. Strength examination of the knee is \par  strength is 5/5 Hamstring strength is 5/5 Ligament Stability and Special Test ligamentously st\par  examination of the knee is as follows: light touch is intact throughout. Gait and function is a\par  Left Knee: Inspection of the knee is as follows: no effusion, erythema, ecchymosis, scars o\par  the knee is as follows: medial joint line tenderness, medial femoral condyle tendernes\par  Range of Motion is as follows: Flexion: 125 degrees. Anterior pain with flexion. Range o\par  extension. Strength examination of the knee is as follows: Quadriceps strength is 5/5 Hamstr\par  5/5 Ligament Stability and Special Test ligamentously stable. Neurological examination of the \par  touch is intact throughout. Gait and function is as follows: patient ambulates without as

## 2024-04-25 NOTE — PROCEDURE
[de-identified] : Procedure Name: Orthovisc (Large Joint)  Viscosupplementation Injection: X-ray evidence of Osteoarthritis on this or prior visit, Patient has tried OTC's including aspirin, Ibuprofen, Aleve etc or prescription NSAIDS, and/or exercises at home and/ or physical therapy without satisfactory response and Repeat series performed because patient had significant improvement in their pain and functional capacity from prior series which was given more than six months ago.   An injection of Orthovisc 2ml #3 was injected into the bilateral knee(s). The risks, benefits, and alternatives to Viscosupplementation injection were explained in full to the patient. Risks outlined include but are not limited to infection, sepsis, bleeding, scarring, skin discoloration, temporary increase in pain, syncopal episode, failure to resolve symptoms, allergic reaction, and symptom recurrence. Signs and symptoms of infection reviewed and patient advised to call immediately for redness, fevers, and/or chills. Patient understood the risks. All questions were answered. After discussion of options, patient requested Viscosupplementation. Oral informed consent was obtained and sterile prep was done of the injection site. The patient tolerated the procedure well. Ice tonight to the injection site.

## 2024-04-25 NOTE — ASSESSMENT
[FreeTextEntry1] : 73 year M WITH MODERATE B/L KNEE OA. HAD GOOD RELIEF FROM ORTHOVISC SERIES 7/28/22. PAIN WORSENS WITH STAIRS AND WALKING PROLONGED DISTANCES. PAIN IS AFFECTING ADL AND FUNCTIONAL ACTIVITIES. TREATMENT OPTIONS REVIEWED.   B/L KNEE ORTHOVISC #3 TODAY. PATIENT TOLERATED INJECTION WELL.

## 2024-04-25 NOTE — HISTORY OF PRESENT ILLNESS
[2] : 2 [0] : 0 [Localized] : localized [Intermittent] : intermittent [Household chores] : household chores [Rest] : rest [Injection therapy] : injection therapy [Standing] : standing [Walking] : walking [Stairs] : stairs [de-identified] : 09/28/2023:   bl knees orthovisc injections #  4   04/04/2024: here for follow up. would like to start b/l knee orthovisc injections  4.11.24 here for ewa knee pain, orthovisc #2  4.25.24 EWA KNEE: ORTHOVISC #3   [] : no [FreeTextEntry1] : BOTH KNEES [de-identified] : orthovisc injections

## 2024-05-02 ENCOUNTER — APPOINTMENT (OUTPATIENT)
Dept: ORTHOPEDIC SURGERY | Facility: CLINIC | Age: 74
End: 2024-05-02
Payer: MEDICARE

## 2024-05-02 VITALS — BODY MASS INDEX: 20.89 KG/M2 | HEIGHT: 75 IN | WEIGHT: 168 LBS

## 2024-05-02 DIAGNOSIS — M17.12 UNILATERAL PRIMARY OSTEOARTHRITIS, LEFT KNEE: ICD-10-CM

## 2024-05-02 DIAGNOSIS — M17.11 UNILATERAL PRIMARY OSTEOARTHRITIS, RIGHT KNEE: ICD-10-CM

## 2024-05-02 PROCEDURE — 20610 DRAIN/INJ JOINT/BURSA W/O US: CPT | Mod: 50

## 2024-05-02 NOTE — PROCEDURE
[de-identified] : Procedure Name: Orthovisc (Large Joint)  Viscosupplementation Injection: X-ray evidence of Osteoarthritis on this or prior visit, Patient has tried OTC's including aspirin, Ibuprofen, Aleve etc or prescription NSAIDS, and/or exercises at home and/ or physical therapy without satisfactory response and Repeat series performed because patient had significant improvement in their pain and functional capacity from prior series which was given more than six months ago.   An injection of Orthovisc 2ml #4 was injected into the bilateral knee(s). The risks, benefits, and alternatives to Viscosupplementation injection were explained in full to the patient. Risks outlined include but are not limited to infection, sepsis, bleeding, scarring, skin discoloration, temporary increase in pain, syncopal episode, failure to resolve symptoms, allergic reaction, and symptom recurrence. Signs and symptoms of infection reviewed and patient advised to call immediately for redness, fevers, and/or chills. Patient understood the risks. All questions were answered. After discussion of options, patient requested Viscosupplementation. Oral informed consent was obtained and sterile prep was done of the injection site. The patient tolerated the procedure well. Ice tonight to the injection site.

## 2024-05-02 NOTE — HISTORY OF PRESENT ILLNESS
[2] : 2 [0] : 0 [Localized] : localized [Intermittent] : intermittent [Household chores] : household chores [Rest] : rest [Injection therapy] : injection therapy [Standing] : standing [Walking] : walking [Stairs] : stairs [de-identified] : 09/28/2023:   bl knees orthovisc injections #  4   04/04/2024: here for follow up. would like to start b/l knee orthovisc injections  4.11.24 here for ewa knee pain, orthovisc #2  4.25.24 EWA KNEE: ORTHOVISC #3  5.1.24 EWA KNEE: ORTHOVISC #4  [] : no [de-identified] : orthovisc injections [FreeTextEntry1] : BOTH KNEES

## 2024-05-02 NOTE — ASSESSMENT
[FreeTextEntry1] : 73 year M WITH MODERATE B/L KNEE OA. HAD GOOD RELIEF FROM ORTHOVISC SERIES 7/28/22. PAIN WORSENS WITH STAIRS AND WALKING PROLONGED DISTANCES. PAIN IS AFFECTING ADL AND FUNCTIONAL ACTIVITIES. TREATMENT OPTIONS REVIEWED.   B/L KNEE ORTHOVISC #4 TODAY. PATIENT TOLERATED INJECTION WELL.

## 2024-05-02 NOTE — IMAGING
[de-identified] : Left Hip/Thigh: Inspection of the hip/thigh is as follows: Inspection shows no swelling. Palp\par  follows: no groin tenderness. Range of motion of the hip is as follows: Patient displays good e\par  rotation at 15 degrees Strength testing of the hip/thigh is as follows: Hip flexion strength is \par  hip/thigh is as follows: no pain at hip flexors with hip extention and knee flexion. \par  Right Knee: Inspection of the knee is as follows: valgus deformity. no ecchymosis and n\par  the knee is as follows: medial joint line tenderness and crepitus. Knee Range of Motio\par  follows: Extension: 0 degrees. Flexion: 125 degrees. Strength examination of the knee is \par  strength is 5/5 Hamstring strength is 5/5 Ligament Stability and Special Test ligamentously st\par  examination of the knee is as follows: light touch is intact throughout. Gait and function is a\par  Left Knee: Inspection of the knee is as follows: no effusion, erythema, ecchymosis, scars o\par  the knee is as follows: medial joint line tenderness, medial femoral condyle tendernes\par  Range of Motion is as follows: Flexion: 125 degrees. Anterior pain with flexion. Range o\par  extension. Strength examination of the knee is as follows: Quadriceps strength is 5/5 Hamstr\par  5/5 Ligament Stability and Special Test ligamentously stable. Neurological examination of the \par  touch is intact throughout. Gait and function is as follows: patient ambulates without as

## 2024-07-09 ENCOUNTER — OFFICE (OUTPATIENT)
Facility: LOCATION | Age: 74
Setting detail: OPHTHALMOLOGY
End: 2024-07-09
Payer: MEDICARE

## 2024-07-09 DIAGNOSIS — H43.393: ICD-10-CM

## 2024-07-09 DIAGNOSIS — H02.032: ICD-10-CM

## 2024-07-09 DIAGNOSIS — H18.413: ICD-10-CM

## 2024-07-09 DIAGNOSIS — H16.223: ICD-10-CM

## 2024-07-09 DIAGNOSIS — H02.035: ICD-10-CM

## 2024-07-09 DIAGNOSIS — H02.403: ICD-10-CM

## 2024-07-09 DIAGNOSIS — H25.13: ICD-10-CM

## 2024-07-09 PROCEDURE — 92014 COMPRE OPH EXAM EST PT 1/>: CPT | Performed by: OPHTHALMOLOGY

## 2024-07-09 ASSESSMENT — LID POSITION - PTOSIS
OS_PTOSIS: LUL
OD_PTOSIS: RUL

## 2024-07-09 ASSESSMENT — LID EXAM ASSESSMENTS
OD_LAXITY: 2+
OS_COMMENTS: 2+ LAXIT
OD_LEVATOR_FUNCTION: 17 MM
OS_LAXITY: 2+
OD_COMMENTS: 2+ LAXIT
OD_MRD1: 1 MM
OS_LEVATOR_FUNCTION: 17 MM
OS_MRD1: 1 MM

## 2024-07-09 ASSESSMENT — CONFRONTATIONAL VISUAL FIELD TEST (CVF)
OS_FINDINGS: FULL
OD_FINDINGS: FULL

## 2025-01-02 ENCOUNTER — APPOINTMENT (OUTPATIENT)
Dept: ORTHOPEDIC SURGERY | Facility: CLINIC | Age: 75
End: 2025-01-02
Payer: MEDICARE

## 2025-01-02 PROCEDURE — 20610 DRAIN/INJ JOINT/BURSA W/O US: CPT | Mod: 50

## 2025-01-02 PROCEDURE — 99214 OFFICE O/P EST MOD 30 MIN: CPT | Mod: 25

## 2025-01-09 ENCOUNTER — APPOINTMENT (OUTPATIENT)
Dept: ORTHOPEDIC SURGERY | Facility: CLINIC | Age: 75
End: 2025-01-09
Payer: MEDICARE

## 2025-01-09 PROCEDURE — 20610 DRAIN/INJ JOINT/BURSA W/O US: CPT | Mod: 50

## 2025-01-16 ENCOUNTER — APPOINTMENT (OUTPATIENT)
Dept: ORTHOPEDIC SURGERY | Facility: CLINIC | Age: 75
End: 2025-01-16
Payer: MEDICARE

## 2025-01-16 PROCEDURE — 20610 DRAIN/INJ JOINT/BURSA W/O US: CPT | Mod: 50

## 2025-01-23 ENCOUNTER — APPOINTMENT (OUTPATIENT)
Dept: ORTHOPEDIC SURGERY | Facility: CLINIC | Age: 75
End: 2025-01-23
Payer: MEDICARE

## 2025-01-23 DIAGNOSIS — M17.11 UNILATERAL PRIMARY OSTEOARTHRITIS, RIGHT KNEE: ICD-10-CM

## 2025-01-23 DIAGNOSIS — M17.12 UNILATERAL PRIMARY OSTEOARTHRITIS, LEFT KNEE: ICD-10-CM

## 2025-01-23 PROCEDURE — 20610 DRAIN/INJ JOINT/BURSA W/O US: CPT | Mod: 50

## 2025-02-27 ENCOUNTER — NON-APPOINTMENT (OUTPATIENT)
Age: 75
End: 2025-02-27

## 2025-06-26 ENCOUNTER — APPOINTMENT (OUTPATIENT)
Dept: ORTHOPEDIC SURGERY | Facility: CLINIC | Age: 75
End: 2025-06-26

## 2025-07-11 ENCOUNTER — OFFICE (OUTPATIENT)
Facility: LOCATION | Age: 75
Setting detail: OPHTHALMOLOGY
End: 2025-07-11
Payer: MEDICARE

## 2025-07-11 DIAGNOSIS — H43.393: ICD-10-CM

## 2025-07-11 DIAGNOSIS — H16.223: ICD-10-CM

## 2025-07-11 DIAGNOSIS — H43.21: ICD-10-CM

## 2025-07-11 DIAGNOSIS — H02.035: ICD-10-CM

## 2025-07-11 DIAGNOSIS — H02.032: ICD-10-CM

## 2025-07-11 DIAGNOSIS — H02.403: ICD-10-CM

## 2025-07-11 DIAGNOSIS — H25.13: ICD-10-CM

## 2025-07-11 DIAGNOSIS — H18.413: ICD-10-CM

## 2025-07-11 PROCEDURE — 92014 COMPRE OPH EXAM EST PT 1/>: CPT | Performed by: OPHTHALMOLOGY

## 2025-07-11 PROCEDURE — 76512 OPH US DX B-SCAN: CPT | Performed by: OPHTHALMOLOGY

## 2025-07-11 PROCEDURE — 92250 FUNDUS PHOTOGRAPHY W/I&R: CPT | Performed by: OPHTHALMOLOGY

## 2025-07-11 ASSESSMENT — REFRACTION_AUTOREFRACTION
OD_SPHERE: +3.25
OS_SPHERE: +3.50
OD_CYLINDER: -0.50
OS_AXIS: 067
OD_AXIS: 088
OS_CYLINDER: -0.25

## 2025-07-11 ASSESSMENT — LID POSITION - PTOSIS
OD_PTOSIS: RUL
OS_PTOSIS: LUL

## 2025-07-11 ASSESSMENT — REFRACTION_CURRENTRX
OS_SPHERE: +3.00
OS_VPRISM_DIRECTION: PROGS
OD_VPRISM_DIRECTION: PROGS
OD_OVR_VA: 20/
OS_OVR_VA: 20/
OD_SPHERE: +3.00
OD_ADD: +2.50
OS_ADD: +2.50
OS_CYLINDER: -0.75
OD_CYLINDER: -0.50
OS_AXIS: 106
OD_AXIS: 123

## 2025-07-11 ASSESSMENT — KERATOMETRY
OS_K1POWER_DIOPTERS: 42.25
METHOD_AUTO_MANUAL: AUTO
OS_AXISANGLE_DEGREES: 085
OD_AXISANGLE_DEGREES: 084
OS_K2POWER_DIOPTERS: 42.75
OD_K1POWER_DIOPTERS: 42.50
OD_K2POWER_DIOPTERS: 43.75

## 2025-07-11 ASSESSMENT — LID EXAM ASSESSMENTS
OD_MRD1: 1 MM
OS_COMMENTS: 2+ LAXIT
OD_LAXITY: 2+
OS_LEVATOR_FUNCTION: 17 MM
OS_LAXITY: 2+
OD_LEVATOR_FUNCTION: 17 MM
OD_COMMENTS: 2+ LAXIT
OS_MRD1: 1 MM

## 2025-07-11 ASSESSMENT — SUPERFICIAL PUNCTATE KERATITIS (SPK)
OD_SPK: 3+
OS_SPK: 3+

## 2025-07-11 ASSESSMENT — VISUAL ACUITY
OS_BCVA: 20/25+1
OD_BCVA: 20/25-2

## 2025-07-11 ASSESSMENT — CONFRONTATIONAL VISUAL FIELD TEST (CVF)
OD_FINDINGS: FULL
OS_FINDINGS: FULL

## 2025-07-14 ENCOUNTER — OFFICE (OUTPATIENT)
Dept: URBAN - METROPOLITAN AREA CLINIC 77 | Facility: CLINIC | Age: 75
Setting detail: OPHTHALMOLOGY
End: 2025-07-14
Payer: MEDICARE

## 2025-07-14 DIAGNOSIS — H25.13: ICD-10-CM

## 2025-07-14 DIAGNOSIS — H21.563: ICD-10-CM

## 2025-07-14 DIAGNOSIS — H02.403: ICD-10-CM

## 2025-07-14 DIAGNOSIS — H43.21: ICD-10-CM

## 2025-07-14 DIAGNOSIS — H02.032: ICD-10-CM

## 2025-07-14 DIAGNOSIS — H18.413: ICD-10-CM

## 2025-07-14 DIAGNOSIS — H02.035: ICD-10-CM

## 2025-07-14 DIAGNOSIS — H43.393: ICD-10-CM

## 2025-07-14 DIAGNOSIS — H16.223: ICD-10-CM

## 2025-07-14 PROCEDURE — 99213 OFFICE O/P EST LOW 20 MIN: CPT | Performed by: OPHTHALMOLOGY

## 2025-07-14 PROCEDURE — 92201 OPSCPY EXTND RTA DRAW UNI/BI: CPT | Performed by: OPHTHALMOLOGY

## 2025-07-14 ASSESSMENT — CONFRONTATIONAL VISUAL FIELD TEST (CVF)
OS_FINDINGS: FULL
OD_FINDINGS: FULL

## 2025-07-14 ASSESSMENT — SUPERFICIAL PUNCTATE KERATITIS (SPK)
OS_SPK: 3+
OD_SPK: 3+

## 2025-07-14 ASSESSMENT — LID EXAM ASSESSMENTS
OS_LAXITY: 2+
OS_MRD1: 1 MM
OD_LAXITY: 2+
OS_LEVATOR_FUNCTION: 17 MM
OD_LEVATOR_FUNCTION: 17 MM
OD_COMMENTS: 2+ LAXIT
OS_COMMENTS: 2+ LAXIT
OD_MRD1: 1 MM

## 2025-07-14 ASSESSMENT — LID POSITION - PTOSIS
OD_PTOSIS: RUL
OS_PTOSIS: LUL

## 2025-07-16 ASSESSMENT — REFRACTION_CURRENTRX
OS_ADD: +2.50
OS_CYLINDER: -0.75
OS_AXIS: 106
OD_CYLINDER: -0.50
OD_SPHERE: +3.00
OS_OVR_VA: 20/
OS_SPHERE: +3.00
OD_ADD: +2.50
OS_VPRISM_DIRECTION: PROGS
OD_AXIS: 123
OD_OVR_VA: 20/
OD_VPRISM_DIRECTION: PROGS

## 2025-07-16 ASSESSMENT — VISUAL ACUITY
OD_BCVA: 20/25
OS_BCVA: 20/25

## 2025-07-16 ASSESSMENT — KERATOMETRY
OD_K2POWER_DIOPTERS: 43.75
OS_AXISANGLE_DEGREES: 085
OS_K2POWER_DIOPTERS: 42.75
METHOD_AUTO_MANUAL: AUTO
OS_K1POWER_DIOPTERS: 42.25
OD_AXISANGLE_DEGREES: 084
OD_K1POWER_DIOPTERS: 42.50

## 2025-07-16 ASSESSMENT — REFRACTION_AUTOREFRACTION
OS_AXIS: 067
OD_CYLINDER: -0.50
OD_AXIS: 088
OS_CYLINDER: -0.25
OD_SPHERE: +3.25
OS_SPHERE: +3.50

## 2025-07-24 ENCOUNTER — APPOINTMENT (OUTPATIENT)
Dept: ORTHOPEDIC SURGERY | Facility: CLINIC | Age: 75
End: 2025-07-24
Payer: MEDICARE

## 2025-07-24 PROCEDURE — 99214 OFFICE O/P EST MOD 30 MIN: CPT | Mod: 25

## 2025-07-24 PROCEDURE — 20610 DRAIN/INJ JOINT/BURSA W/O US: CPT | Mod: 50

## 2025-07-31 ENCOUNTER — APPOINTMENT (OUTPATIENT)
Dept: ORTHOPEDIC SURGERY | Facility: CLINIC | Age: 75
End: 2025-07-31
Payer: MEDICARE

## 2025-07-31 PROCEDURE — 20610 DRAIN/INJ JOINT/BURSA W/O US: CPT | Mod: 50

## 2025-08-07 ENCOUNTER — APPOINTMENT (OUTPATIENT)
Dept: ORTHOPEDIC SURGERY | Facility: CLINIC | Age: 75
End: 2025-08-07
Payer: MEDICARE

## 2025-08-07 PROCEDURE — 73564 X-RAY EXAM KNEE 4 OR MORE: CPT | Mod: 50

## 2025-08-07 PROCEDURE — 20610 DRAIN/INJ JOINT/BURSA W/O US: CPT | Mod: 50

## 2025-08-14 ENCOUNTER — APPOINTMENT (OUTPATIENT)
Dept: ORTHOPEDIC SURGERY | Facility: CLINIC | Age: 75
End: 2025-08-14
Payer: MEDICARE

## 2025-08-14 DIAGNOSIS — M17.11 UNILATERAL PRIMARY OSTEOARTHRITIS, RIGHT KNEE: ICD-10-CM

## 2025-08-14 DIAGNOSIS — M17.12 UNILATERAL PRIMARY OSTEOARTHRITIS, LEFT KNEE: ICD-10-CM

## 2025-08-14 PROCEDURE — 20610 DRAIN/INJ JOINT/BURSA W/O US: CPT | Mod: 50
